# Patient Record
Sex: FEMALE | Employment: OTHER | URBAN - METROPOLITAN AREA
[De-identification: names, ages, dates, MRNs, and addresses within clinical notes are randomized per-mention and may not be internally consistent; named-entity substitution may affect disease eponyms.]

---

## 2018-07-10 ENCOUNTER — OFFICE VISIT (OUTPATIENT)
Dept: UROLOGY | Facility: HOSPITAL | Age: 68
End: 2018-07-10
Attending: OBSTETRICS & GYNECOLOGY
Payer: MEDICARE

## 2018-07-10 VITALS
DIASTOLIC BLOOD PRESSURE: 74 MMHG | BODY MASS INDEX: 35.44 KG/M2 | WEIGHT: 200 LBS | SYSTOLIC BLOOD PRESSURE: 138 MMHG | HEIGHT: 63 IN

## 2018-07-10 DIAGNOSIS — N81.6 RECTOCELE: ICD-10-CM

## 2018-07-10 DIAGNOSIS — N95.2 POSTMENOPAUSAL ATROPHIC VAGINITIS: ICD-10-CM

## 2018-07-10 DIAGNOSIS — N39.3 FEMALE STRESS INCONTINENCE: ICD-10-CM

## 2018-07-10 DIAGNOSIS — N39.41 URGE INCONTINENCE: Primary | ICD-10-CM

## 2018-07-10 DIAGNOSIS — N81.84 PELVIC MUSCLE WASTING: ICD-10-CM

## 2018-07-10 LAB
BLOOD URINE: NEGATIVE
CONTROL RUN WITHIN 24 HOURS?: YES
NITRITE URINE: NEGATIVE

## 2018-07-10 PROCEDURE — 99201 HC OUTPT EVAL AND MGNT NEW PT LEVEL 1: CPT

## 2018-07-10 PROCEDURE — 87086 URINE CULTURE/COLONY COUNT: CPT | Performed by: OBSTETRICS & GYNECOLOGY

## 2018-07-10 PROCEDURE — 87147 CULTURE TYPE IMMUNOLOGIC: CPT | Performed by: OBSTETRICS & GYNECOLOGY

## 2018-07-10 RX ORDER — SENNA AND DOCUSATE SODIUM 50; 8.6 MG/1; MG/1
1 TABLET, FILM COATED ORAL DAILY
COMMUNITY

## 2018-07-10 RX ORDER — DEXTROAMPHETAMINE SACCHARATE, AMPHETAMINE ASPARTATE, DEXTROAMPHETAMINE SULFATE AND AMPHETAMINE SULFATE 5; 5; 5; 5 MG/1; MG/1; MG/1; MG/1
TABLET ORAL
Refills: 0 | COMMUNITY
Start: 2018-05-18

## 2018-07-10 RX ORDER — CETIRIZINE HYDROCHLORIDE 10 MG/1
10 TABLET ORAL DAILY
COMMUNITY

## 2018-07-10 RX ORDER — DULOXETIN HYDROCHLORIDE 30 MG/1
30 CAPSULE, DELAYED RELEASE ORAL DAILY
COMMUNITY

## 2018-07-10 RX ORDER — DULOXETIN HYDROCHLORIDE 60 MG/1
60 CAPSULE, DELAYED RELEASE ORAL DAILY
COMMUNITY

## 2018-07-10 NOTE — PATIENT INSTRUCTIONS
[id number] [address] [facility] MEDICINE    BOWEL REGIMEN    Constipation can have detrimental effects on bladder function and can worsen the symptoms of prolapse. It is important to avoid constipation.     The first step for treating constipation is to i

## 2018-07-10 NOTE — PROGRESS NOTES
Ana Rm,   7/10/2018     Referred by family    Patient presents with:   Incontinence: referred by family member  and online search  Prolapse: had bladder surgery in past, pt feels like bladder is \"coming out\"    2008 prolapse surgery    HPI:  +S lesions  Urethra: +atrophy, nontender  Bladder:+fullness, nontender  Vagina: +atrophy  Cervix & Uterus: absent  Adnexa:no masses, nontender  Perineum: nontender  Anus: wnl  Rectum: defer    PELVIS FLOOR NEUROMUSCULAR FUNCTION:  Strength:  1, Unable to hold Cream  Fiber  Miralax  OAB meds    Treatment Plan, Non-surgical:   RN teaching/pt education done  Fiber supplement  Stimulant:  MOM, Miralax    Treatment Plan, Surgical: briefly discussed  Enterocele repair  Posterior perineorrhaphy  Mid-urethral slings (T

## 2018-07-12 ENCOUNTER — TELEPHONE (OUTPATIENT)
Dept: UROLOGY | Facility: HOSPITAL | Age: 68
End: 2018-07-12

## 2018-09-12 ENCOUNTER — OFFICE VISIT (OUTPATIENT)
Dept: UROLOGY | Facility: HOSPITAL | Age: 68
End: 2018-09-12
Attending: OBSTETRICS & GYNECOLOGY
Payer: MEDICARE

## 2018-09-12 VITALS — SYSTOLIC BLOOD PRESSURE: 134 MMHG | DIASTOLIC BLOOD PRESSURE: 84 MMHG

## 2018-09-12 DIAGNOSIS — N81.6 RECTOCELE: Primary | ICD-10-CM

## 2018-09-12 DIAGNOSIS — R35.0 FREQUENCY OF URINATION: ICD-10-CM

## 2018-09-12 LAB
BLOOD URINE: NEGATIVE
CONTROL RUN WITHIN 24 HOURS?: YES
NITRITE URINE: NEGATIVE

## 2018-09-12 PROCEDURE — 81002 URINALYSIS NONAUTO W/O SCOPE: CPT

## 2018-09-12 PROCEDURE — 51797 INTRAABDOMINAL PRESSURE TEST: CPT

## 2018-09-12 PROCEDURE — 51784 ANAL/URINARY MUSCLE STUDY: CPT

## 2018-09-12 PROCEDURE — 51729 CYSTOMETROGRAM W/VP&UP: CPT

## 2018-09-12 NOTE — PROCEDURES
Patient here for urodynamic testing. Procedure explained and confirmed by patient. See evaluation form for results. Both verbal and written discharge instructions were given.   Patient tolerated procedure well and will follow up with Dr. Al Rajput No  Prolapse reduced for testing?   [x]  Yes  []  No  [x]  Pessary  []  Speculum  []  Proctoswab  []  Vag Packing    Additional Notes:Pessary removed after flow study  UROFLOWMETRY: unable to void for uroflow - does not feel urge to void in BR    Post-void []  Typical   [x]  Atypical    Additional Notes:    EMG:  [x]  Reactive []  Non-Reactive    9/12/2018 1:42 PM     PERFORMED BY:  Ruiz Murillo RN      URODYNAMIC PHYSICIAN INTERPRETATION    IMPRESSION:  -/78cc & 317/80cc  +DO @ 300cc  FPC 370cc  No RASHAD    Post-

## 2018-09-12 NOTE — PATIENT INSTRUCTIONS
[facility]  [address] [place] [address],[address]  [place], [facility]  Office: [phone]      Urodynamic Testing Discharge Instructions: There are NO dietary or activity restrictions. You may resume your normal schedule.       You may hav home.    Where will I go to get the catheter removed? Your catheter will be removed in the office. Please call the office to schedule a voiding trial with the nurse. How will the catheter be removed?   A nurse will disconnect your catheter from the [address] [place] [address] Street have a fever over 100.5 for 4 hours or above 101.0 anytime. · You have nausea and/or vomiting. · Burning/pain with urination. Please feel free to call the nurse at [phone] with any questions 8am-4:30pm Monday-Friday.     If the office is closed, y

## 2018-09-25 ENCOUNTER — OFFICE VISIT (OUTPATIENT)
Dept: UROLOGY | Facility: HOSPITAL | Age: 68
End: 2018-09-25
Attending: OBSTETRICS & GYNECOLOGY
Payer: MEDICARE

## 2018-09-25 VITALS
WEIGHT: 200 LBS | DIASTOLIC BLOOD PRESSURE: 68 MMHG | SYSTOLIC BLOOD PRESSURE: 138 MMHG | HEIGHT: 63 IN | BODY MASS INDEX: 35.44 KG/M2

## 2018-09-25 DIAGNOSIS — N81.6 RECTOCELE: Primary | ICD-10-CM

## 2018-09-25 DIAGNOSIS — N39.41 URGE INCONTINENCE: ICD-10-CM

## 2018-09-25 PROCEDURE — 99211 OFF/OP EST MAY X REQ PHY/QHP: CPT

## 2018-09-25 RX ORDER — OXYBUTYNIN CHLORIDE 5 MG/1
5 TABLET, EXTENDED RELEASE ORAL DAILY
Qty: 90 TABLET | Refills: 3 | Status: SHIPPED | OUTPATIENT
Start: 2018-09-25 | End: 2018-12-24

## 2018-09-25 NOTE — PATIENT INSTRUCTIONS
Start oxybutynin ER 5mg daily  Work to improve bowel movements  Call with an update in 4 wks    [facility]    Constipation can have detrimental effects on bladder function and can worsen the symptoms of prola Chilies/Spicy foods Tea   Citrus fruits Tomatoes   Coffee Vinegar   Cranberries Nuts   Grapes Onions   Beer Pickled herring   Bone’s yeast Pineapple   Canned figs Prunes   Champagne Raisins   Cheeses (hard and soft) Rye bread   Chicken livers Saccharin above each time you increase the interval.    5. If you are having difficulty with voiding often during the night, monitor your fluid intake during the evening. Caffeinated beverages such as coffee, soda pop, or chocolate can cause urinary frequency.   The

## 2018-09-25 NOTE — PROGRESS NOTES
Pt presents w/ initial c/o UI, bulge (splints for BM)  Biggest bother is UUI, but does report bulge sx  No med trial  Bowels irregular, known hemorrhoids    Urodynamic testing undergone without complication.   Results reviewed with patient  -/78cc & 317/80c

## 2024-08-26 ENCOUNTER — OFFICE VISIT (OUTPATIENT)
Facility: LOCATION | Age: 74
End: 2024-08-26
Payer: MEDICARE

## 2024-08-26 DIAGNOSIS — N81.9 FEMALE GENITAL PROLAPSE, UNSPECIFIED TYPE: ICD-10-CM

## 2024-08-26 DIAGNOSIS — N39.41 URGE INCONTINENCE: ICD-10-CM

## 2024-08-26 DIAGNOSIS — N39.0 RECURRENT UTI: Primary | ICD-10-CM

## 2024-08-26 DIAGNOSIS — R33.9 INCOMPLETE EMPTYING OF BLADDER: ICD-10-CM

## 2024-08-26 DIAGNOSIS — R31.0 GROSS HEMATURIA: ICD-10-CM

## 2024-08-26 LAB
BILIRUBIN: NEGATIVE
GLUCOSE (URINE DIPSTICK): NEGATIVE MG/DL
KETONES (URINE DIPSTICK): NEGATIVE MG/DL
MULTISTIX LOT#: ABNORMAL NUMERIC
NITRITE, URINE: NEGATIVE
PH, URINE: 6 (ref 4.5–8)
PROTEIN (URINE DIPSTICK): 100 MG/DL
SPECIFIC GRAVITY: 1.02 (ref 1–1.03)
URINE-COLOR: YELLOW
UROBILINOGEN,SEMI-QN: 0.2 MG/DL (ref 0–1.9)

## 2024-08-26 RX ORDER — POTASSIUM CHLORIDE 14.9 MG/ML
INJECTION INTRAVENOUS
COMMUNITY
Start: 2024-07-01

## 2024-08-26 RX ORDER — HYDROCHLOROTHIAZIDE 12.5 MG/1
12.5 TABLET ORAL DAILY
COMMUNITY
Start: 2022-03-24

## 2024-08-26 RX ORDER — DICLOFENAC SODIUM 75 MG/1
75 TABLET, DELAYED RELEASE ORAL 2 TIMES DAILY
COMMUNITY
Start: 2024-05-23

## 2024-08-26 RX ORDER — HYDROXYZINE HYDROCHLORIDE 25 MG/1
25 TABLET, FILM COATED ORAL
COMMUNITY

## 2024-08-26 RX ORDER — ESTRADIOL 0.1 MG/G
CREAM VAGINAL
Qty: 42.5 G | Refills: 5 | Status: SHIPPED | OUTPATIENT
Start: 2024-08-26

## 2024-08-26 RX ORDER — METOPROLOL SUCCINATE 25 MG/1
25 TABLET, EXTENDED RELEASE ORAL DAILY
COMMUNITY
Start: 2024-07-01

## 2024-08-26 RX ORDER — DEXTROAMPHETAMINE SULFATE 20 MG/1
TABLET ORAL
COMMUNITY

## 2024-08-26 RX ORDER — ALBUTEROL SULFATE 90 UG/1
2 AEROSOL, METERED RESPIRATORY (INHALATION)
COMMUNITY

## 2024-08-26 RX ORDER — KETOCONAZOLE 20 MG/G
1 CREAM TOPICAL DAILY
COMMUNITY

## 2024-08-26 RX ORDER — PANTOPRAZOLE SODIUM 20 MG/1
20 TABLET, DELAYED RELEASE ORAL DAILY
COMMUNITY
Start: 2024-06-28

## 2024-08-26 RX ORDER — ACETAMINOPHEN 325 MG/1
325 TABLET ORAL
COMMUNITY

## 2024-08-26 RX ORDER — LEVOTHYROXINE SODIUM 25 UG/1
25 TABLET ORAL DAILY
COMMUNITY

## 2024-08-26 RX ORDER — POLYETHYLENE GLYCOL 3350 17 G/17G
17 POWDER, FOR SOLUTION ORAL DAILY
COMMUNITY
Start: 2024-03-01

## 2024-08-26 NOTE — PATIENT INSTRUCTIONS
Urinary tract infections can affect your general health and your quality of life.  Some UTI’s can be prevented.  Here are some suggestions that my help prevent frequent UTI’s.     Good Hygiene: Always wipe front to back.  Don’t use perfumed soaps.  Plain soaps like Ivory are the best.  Don’t use washcloths.  Place soap directly on your hands and clean the genital area.  Rinse thoroughly.  Soap can be very irritating to the vaginal mucosa.     Urination: Urinate every 2-3 hours during the day.  Empty your bladder just before going to bed and  first thing when you wake up.  Make sure you go to the bathroom when you have a strong urge. Don't hover over the toilet to urinate.  The bladder may not be completely emptying when using this technique.  Sometimes you may need to \"double-void\".  After you finish urinating, stand up, then sit back down to urinate again.     Clothing: Wear cotton underwear. Change daily.  Avoid tight jeans.       [place]: Sometimes UTI’s are related to intercourse.  Wash genital area before and rinse afterwards.  Empty your bladder before and after intercourse.  Use of vaginal lubricants may be helpful.  Condoms and some lubricants may be irritating to the vaginal mucosa.  Spermicide should be avoided.  Talk to your doctor, you may require a suppressive antibiotic to take after intercourse.     Supplements: Take Vitamin C 500 or 1000mg daily.  Cranberry pills 400mg daily or if symptomatic 400mg two times per day.  Eat yogurt or consider taking a probiotic.  D-mannose 1 gram is another supplement that can help prevent infections.  This one is harder to find, but can be found at stores such as [employer], [employer], or a specialized vitamin store.  Fluid intake should be at least 48oz daily with the goal of 64oz daily.  Water is preferable.      Constipation: Constipation has been linked to UTI’s.  You should be having a soft BM daily.  Dietary fiber should be between 20-25 grams daily.   Flaxseed, All-Bran cereal, Fiber One bars, fruits and vegetables are a good sources of fiber.  Alternatively, Metamucil can be used daily.  Gentle laxatives and stool softeners may be added on a daily or as needed basis if necessary (Miralax, Colace, Pericolace).      Vaginal creams and moisturizers: It may be recommended you try vaginal creams, moisturizers or lubricants as a prevention method.  Over-the-Counter products:  Replens:  1 applicator three times per week  Luvena prebiotic vaginal moisturizer and lubricant:  package of 6.  1 tube every three days at bedtime.  Helps restore pH balance, decrease vaginal dryness, odor and itchiness.  KY liquibeads  KY silk - moisture enhancer  MeAgain Vaginal Moisturizer.  8 per package.  Use 1 daily for 7 days then 1 daily two times per week.  Astroglide  Prescriptions:  Estrace Cream  Premarin Cream  E-string     Prescription medications: Your doctor may recommend daily or as needed prescription medications including antibiotics to prevent urinary tract infection as well.

## 2024-08-26 NOTE — PROGRESS NOTES
[facility], [address] [place] [address]    Urology Consult Note    History of Present Illness:   Patient is a 73 year old female with hx of arthritis, hypothyroidism, depression, mixed urinary incontinence, prolapse, who presents for evaluation of multiple issues.    Patient seen at [facility] for gross hematuria as noted below (neg CT and cysto completed). Is also being seen by urogynecology at outside facility for prolapse, currently managed with #2 ring. See below notes for both. Has had recurrent UTIs, no current preventative treatment plan in place.     Here today more so for management of recurrent UTI.  Baseline LUTS - nocturia x 2-3, UUI wearing depends or pads - has improved since pessary. Still issue at night.   Tries timed voids and voiding techniques.  Drinks 5-6 bottles of water daily.   Bowels improved, on miralax.   Wipes front to back +.   + vaginal irritation. Had been prescribed estrace cream previously but discontinued due to the cost.    UA today large leuks, blood  PVR 142mL    Brings records Scr 7/26/24 0.93. Urine culture 8/14 negative but another urine culture 8/15 enterococcus.     Patient presented to [facility] ED for gross hematuria and abdominal pain. CT scan with multiple peripheral enhancing fluid collections with largest located anterior to the bladder and containing gas approximately measuring 4.8 x 6.5 cm. Infected fluid collections cannot be excluded. She was admitted and underwent cystoscopy to rule out fistula. Cystoscopy showed a very inflamed bladder without evidence of fistula, biopsies taken were negative malignancy. Follow up contrast CT scan also without evidence of fistula. Final urine culture candida albicans 25-50K, treated with metronidazole. Has had subsequent enterococcus UTI treated with amoxicillin TID.     Was seen by urogynecology in 2022 for urinary complaints and pelvic prolapse. Failed pessary trial at that time and offered colpocleisis which  she was nervous about not being able to have penetrative intercourse. She was advised to return after seeing GI for management of her chronic constipation if she wanted to pursue surgery. Her PVR at that time was 240mL.  Returned to see them a few weeks ago and had #2 ring with support placed with improvement of her urinary complaints. Repeat PVR last week was 150mL. She will be seeing them in 1 month.     HISTORY:  Past Medical History:    Arthritis    Depression      Past Surgical History:   Procedure Laterality Date    Hysterectomy  1984    Other surgical history  2007    bariatric bypass    Other surgical history  2009    hernia repair    Other surgical history  2008    bladder repair      No family history on file.   Social History:   Social History     Socioeconomic History    Marital status: Unknown   Tobacco Use    Smoking status: Former     Social Determinants of Health     Financial Resource Strain: High Risk (8/21/2024)    Received from [facility]    Overall Financial Resource Strain (CARDIA)     Difficulty of Paying Living Expenses: Very hard   Food Insecurity: Food Insecurity Present (8/21/2024)    Received from [facility]    Hunger Vital Sign     Worried About Running Out of Food in the Last Year: Often true     Ran Out of Food in the Last Year: Often true   Transportation Needs: Unmet Transportation Needs (8/21/2024)    Received from [facility]    PRAPARE - Transportation     Lack of Transportation (Medical): Yes     Lack of Transportation (Non-Medical): Yes   Housing Stability: High Risk (8/21/2024)    Received from [facility]    Housing Stability Vital Sign     Unable to Pay for Housing in the Last Year: Yes     In the last 12 months, was there a time when you did not have a steady place to sleep or slept in a shelter (including now)?: No        Allergies  No Known Allergies    Review of Systems:   A 10-point  review of systems was completed and is negative other than as noted above.    Physical Exam:   There were no vitals taken for this visit.    GENERAL APPEARANCE: well developed, well nourished, in no acute distress  NEUROLOGIC: no localizing neurologic signs, alert and oriented x 3, converses appropriately  HEAD: atraumatic, normocephalic  EYES: sclera non-icteric  ORAL CAVITY: mucosa moist  NECK/THYROID: no obvious masses or goiter  LUNGS: non-labored breathing  EXTREMITIES: warm, well-perfused. No clubbing, cyanosis or edema.  SKIN: no obvious rashes    Results:     Laboratory Data:  No results found for: \"WBC\", \"HGB\", \"PLT\"  No results found for: \"NA\", \"K\", \"CL\", \"CO2\", \"BUN\", \"CREATININE\", \"GLU\", \"GFRAA\", \"AST\", \"ALT\", \"TP\", \"ALB\", \"PHOS\", \"CA\", \"MG\"    Urinalysis Results (last three years):  No results for input(s)  in the last 3 years    Urine Culture Results (last three years):  Lab Results   Component Value Date    URINECUL (A) 07/10/2018     <10,000 CFU/ML Streptococcus agalactiae (Group B beta strep)       Imaging  No results found.    Operative Report - Michael Palmer MD - 07/08/2024 8:14 AM CDT  Formatting of this note is different from the original.  [facility]    OPERATIVE REPORT      Pre-operative Diagnosis: Gross hematuria    Post-operative Diagnosis: Same    Procedure:  Cystoscopy  Clot evacuation  Transurethral resection of bladder tumor, ~2cm in size  Cystogram  Fluoroscopy, with interpretation of images, less than one hour    Surgeon: Michael Palmer MD    Assistant(s): None    Anesthesia: General with LMA    Prophylactic Antibiotics Given: Ceftriaxone on the floor    Procedural Technique:  Patient is a 74 yo F with gross hematuria and recurrent UTIs. CT scan showed likely abscesses adjacent to the bladder wall. The patient is here for cystoscopy, bladder biopsy. The risk, benefits, and alternatives were discussed with the patient. Risks include pain, bleeding, infection,  damage to surrounding structures, discomfort due to the catheter. The patient reports understanding and is willing to proceed.    Patient was appropriately identified in the pre-operative holding area with two patient identifiers. Consent was reviewed with the patient, who was in agreement. The patient was brought back to the operating room and transferred to the operating room table. Bilateral SCDs were placed. Melyssa-operative antibiotics in the form of ceftriaxone was given to the patient on the floor. General anesthesia was induced with a LMA. The patient was placed in the dorsal lithotomy position, carefully noting to pad all bony prominences. All joints were in neutral position. The patient was prepped and draped in the usual sterile manner. A time-out was initiated.    We entered her bladder with a 22Fr rigid cystourethroscope. There was clot that we evacuated with a Rolf syringe. The bladder wall appeared very inflamed and irritated. We obtained urine for cytology. We performed a cystogram. The bladder was not spherical. There was a protrusion at the left dome of the bladder. No obvious extravasation. We switched to the resectoscope. We resected a nodular area at the bladder dome and at the posterior bladder wall. Each areas was about 1cm in size. We obtained hemostasis of these areas. There were multiple areas oozing the bladder. We tried to cauterize these areas as best as possible, but it seemed like the more we cauterized, additional areas of bleeding around the cauterized areas would bleed. We placed a 22Fr three-way catheter, but did not start continuous bladder irrigation. WOULD NOT RECOMMEND CONTINUOUS BLADDER IRRIGATION because the bladder wall appeared thin and I am concerned about bladder rupture. We manually irrigated the bladder easily, but there was thin hematuric urine noted. She was cleaned and dried. She was taken out of the dorsal lithotomy position. She was awoken from general anesthesia  and transferred to the PACU to recover.    Disposition: Patient will be monitored on the floor. We will consult surgery regarding concern for colovesical fistula. We will also discuss management based on the pathology    Findings:  Very inflamed and irritated bladder wall. Nodular areas are the dome and posterior wall biopsied.  We tried to obtain hemostasis as best as possible, but there was still mild oozing despite cauterization  Choudhury catheter placed    I spoke with the patient's daughter.    Estimated blood loss: Minimal    Specimen/Tissue Removal:  ID Type Source Tests Collected by Time Destination  1 : Urine Straight Cath Urine Straight Cath Michael Palmer MD 7/8/2024 0849  2 : bladder urine for cytology Fluid Fluid FLUID CYTOLOGY MISCELLANEOUS Michael Palmer MD 7/8/2024 0852  A : bladder dome biopsy Tissue Tissue PATHOLOGY SURGICAL Michael Palmer MD 7/8/2024 0839  B : posterior bladder wall Tissue Tissue PATHOLOGY SURGICAL Michael Palmer MD 7/8/2024 0842    Complications: None    Michael Palmer MD     PATH #:[id number]  SPECIMEN DATE:   Accession #: [id number]  Date of Surgery: 07/08/2024    FINAL PATHOLOGY DIAGNOSIS:    A. Bladder dome biopsy: Markedly inflamed fibromuscular tissues with acute  inflammatory exudate and abscess formation.             No evidence of malignancy.    B. Posterior bladder wall biopsy: Markedly inflamed fibromuscular tissues  with acute inflammatory exudate and abscess  formation.             No evidence of malignancy.    Comment: Immunostains CKAE 1/AE3, P63, desmin and KAVEH -3 on paraffin blocks  A1 and B1 supports the above findings.  All immunostain controls show appropriate reactivity.    QA review: Key portion of case is reviewed by one or more pathologists as  part of Pathology QA process.            Alissa Avalos MD, FCAP  Electronic Signature  7/9/2024     Impression:     Patient is a 73 year old female with hx of arthritis, hypothyroidism, depression,  mixed urinary incontinence, prolapse, who presents for evaluation of multiple issues.    Records reviewed with patient and daughter.    We reviewed cystitis tx and prevention strategies at today's visit and I provided educational materials for this. Discussed dietery and behavioral issues including cranberry / extract pills / supplements, fluids, voiding technique, post coital hygiene. Double voiding, bending over after void to completely empty. I encouraged the patient to drink at least 40-60 oz water per day or enough to keep urine clear. I also reviewed dietary irritants and provided educational materials for this. We reviewed use of estrace cream in prevention of UTI, patient willing to trial again. Script sent.     Reviewed recent cytoscopy and CT scan results from July 2024. Pathology negative, suspected from above.     Finally we discussed her prolapse, which I recommend she continue to follow with urogynecology team. Improvement of residual with pessary placement which is likely another factor to #1.    Recommendations:  As above. VIKOR PCR today. Estrace cream. Consider methenamine in future.     Thank you very much for this consult. Please call if there are any questions or concerns.     Irma Amaya PA-C  Urology  [facility]    Date: 8/26/2024

## 2024-08-29 ENCOUNTER — TELEPHONE (OUTPATIENT)
Dept: SURGERY | Facility: CLINIC | Age: 74
End: 2024-08-29

## 2024-08-29 NOTE — TELEPHONE ENCOUNTER
VIKOR with Klebsiella, Staph, and Enterococcus    First line for Klebsiella only Bactrim  Other options Augmentin or fosfomycin or cipro.    Will treat with Augmentin to cover enterococcus also.    Augmentin BID x 7 days

## 2024-09-13 ENCOUNTER — TELEPHONE (OUTPATIENT)
Dept: SURGERY | Facility: CLINIC | Age: 74
End: 2024-09-13

## 2024-09-13 NOTE — TELEPHONE ENCOUNTER
Patients daughter Maribel calling and a  Augmentin, twice daily for 7 days. Please call contact daughter to discuss script dosage first at [phone],thanks.

## 2024-09-13 NOTE — TELEPHONE ENCOUNTER
Called pt's daughter to discuss below.   Daughter states pt is just now starting the antibiotics.   Daughter would like to know if we can send for 3 more days (total of 10 days).    ANIBAL Solis: For your review.

## 2024-09-13 NOTE — TELEPHONE ENCOUNTER
Irma Jimenez PA-C  You; [facility] Clinical Staff11 minutes ago (12:48 PM)     ok for additional 3 days       Called pt's daughter and discussed above.  Script sent.

## 2024-10-04 ENCOUNTER — TELEPHONE (OUTPATIENT)
Dept: SURGERY | Facility: CLINIC | Age: 74
End: 2024-10-04

## 2024-10-04 ENCOUNTER — OFFICE VISIT (OUTPATIENT)
Dept: SURGERY | Facility: CLINIC | Age: 74
End: 2024-10-04
Payer: MEDICARE

## 2024-10-04 DIAGNOSIS — R33.9 INCOMPLETE EMPTYING OF BLADDER: ICD-10-CM

## 2024-10-04 DIAGNOSIS — N95.2 VAGINAL ATROPHY: ICD-10-CM

## 2024-10-04 DIAGNOSIS — N81.10 VAGINAL WALL PROLAPSE: ICD-10-CM

## 2024-10-04 DIAGNOSIS — N39.0 RECURRENT UTI: Primary | ICD-10-CM

## 2024-10-04 LAB
BILIRUBIN: NEGATIVE
GLUCOSE (URINE DIPSTICK): NEGATIVE MG/DL
MULTISTIX LOT#: ABNORMAL NUMERIC
NITRITE, URINE: NEGATIVE
PH, URINE: 7 (ref 4.5–8)
PROTEIN (URINE DIPSTICK): >=300 MG/DL
SPECIFIC GRAVITY: 1.02 (ref 1–1.03)
URINE-COLOR: YELLOW
UROBILINOGEN,SEMI-QN: 0.2 MG/DL (ref 0–1.9)

## 2024-10-04 PROCEDURE — 81003 URINALYSIS AUTO W/O SCOPE: CPT | Performed by: PHYSICIAN ASSISTANT

## 2024-10-04 PROCEDURE — 51798 US URINE CAPACITY MEASURE: CPT | Performed by: PHYSICIAN ASSISTANT

## 2024-10-04 PROCEDURE — 99214 OFFICE O/P EST MOD 30 MIN: CPT | Performed by: PHYSICIAN ASSISTANT

## 2024-10-04 RX ORDER — CIPROFLOXACIN 500 MG/1
500 TABLET, FILM COATED ORAL EVERY 12 HOURS
COMMUNITY
Start: 2024-09-22

## 2024-10-04 NOTE — PATIENT INSTRUCTIONS
Disp Refills Start End    estradiol (ESTRACE) 0.1 MG/GM Vaginal Cream 42.5 g 5 8/26/2024 --    Sig: Apply a small (pea-sized) amount to periurethral region daily for 2 weeks, followed by three times weekly thereafter    Sent to pharmacy as: Estradiol 0.1 MG/GM Vaginal Cream (Estrace)    E-Prescribing Status: Receipt confirmed by pharmacy (8/26/2024 11:54 AM CDT)      Pharmacy    [facility] [address] [place] [address] [phone], [phone]     Fingertip Application Method  For Estrogen Cream     Apply vaginal estrogen cream nightly at bedtime to external vagina for 2 weeks straight.  After 2 weeks, use cream 3 nights a week (i.e Monday, Wednesday, Friday) thereafter.   Follow the instructions below to help you.      Wash your hands thoroughly. Discard the plastic applicator.      Squeeze tube to express out enough to cover the tip of your index finger, from the last joint to the finger tip.  Figure 1       Figure 1     Locate vaginal opening.  Immediately above the vagina is the urethra (a small opening where urine is eliminated from your body). Figure 2   Carefully spread the cream onto the vaginal/urethral area.  Some may be gently inserted into the vagina additionally.          Figure 2    Cystoscopy    Cystoscopy is a test that allows your doctor to look at the inside of the bladder and the urethra using a thin, lighted instrument called a cystoscope.  The cystoscope is inserted into your urethra and slowly advanced into the bladder. Cystoscopy allows your doctor to look at areas of your bladder and urethra that usually do not show up well on X-rays. Tiny surgical instruments can be inserted through the cystoscope that allow your doctor to remove samples of tissue (biopsy) or samples of urine.  Small bladder stones and some small growths can be removed during cystoscopy. This may eliminate the need for more extensive surgery.     Why It Is Done  Cystoscopy may be done to:  Find the  cause of symptoms such as blood in the urine (hematuria), painful urination (dysuria), urinary incontinence, urinary frequency or hesitancy, an inability to pass urine (retention), or a sudden and overwhelming need to urinate (urgency).   Find the cause of problems of the urinary tract, such as frequent, repeated urinary tract infections or urinary tract infections that do not respond to treatment.   Look for problems in the urinary tract, such as blockage in the urethra caused by an enlarged prostate, kidney stones, or tumors.   Evaluate problems that cannot be seen on X-ray or to further investigate problems detected by ultrasound or during intravenous pyelography, such as kidney stones or tumors.   Remove tissue samples for biopsy.   Remove foreign objects.   Treat urinary tract problems. For example, cystoscopy can be done to remove urinary tract stones or growths, treat bleeding in the bladder, relieve blockages in the urethra, or treat or remove tumors.   How To Prepare  You may eat and drink normally before the procedure. You may be asked to give a urine sample at the time of your procedure. Please do not urinate before.    How It Is Done  Cystoscopy is performed by a urologist, with one or more assistants. The test is done in a special testing room in the doctor's office.  You will need undress from the waste down, and you will be given a cloth or paper covering to use during the test. You will lie on your back on a special table. Females will have their knees bent and feet placed in stirrups. Males will lay flat on the table, legs straight. Your genital area is cleaned with an antiseptic solution, and your abdomen and thighs are covered with sterile cloths. A local anesthetic jelly will be inserted into the urethra. No needles are used.   After the anesthetic takes effect, a well-lubricated cystoscope is inserted into your urethra and slowly advanced into your bladder. If your urethra has a spot that is too  narrow to allow the scope to pass, other smaller instruments are inserted first to gradually enlarge the opening.  After the cystoscope is inside your bladder, either sterile water or saline is injected through the scope to help expand your bladder and to create a clear view. Tiny instruments may be inserted through the scope to collect tissue samples for biopsy if necessary; the tissue samples then are sent to the laboratory for analysis.  The cystoscope is usually in your bladder for only 2 to 10 minutes. But the entire test may take up to 30 minutes or longer.  You will be able to get up immediately following the procedure and proceed with normal daily activities.     How It Feels  Most people report that this test is not nearly as uncomfortable as they had expected.   When local anesthetic is used, you may feel a burning sensation or an urge to urinate when the instrument is inserted and removed. Also, when your bladder is irrigated with sterile water or saline, you may feel a cool sensation, an uncomfortable fullness, and an urgent need to urinate. Try to relax during the test by taking slow, deep breaths. Also, if the test is lengthy, lying on the table can become tiring and uncomfortable.     After the test  After the test, you may need to urinate frequently, with some burning during and after urination for a day or two. Drink lots of fluids to help minimize the burning and to prevent a urinary tract infection. You may also see a tinge of blood in the urine for 2-3 days.    You will be given an instruction sheet following your cystoscopy with post procedure instructions.     Results  Cystoscopy is a test that allows the doctor to look at the inside of the bladder and the urethra. Your doctor may be able to talk to you about some of the results right after the cystoscopy. If a biopsy is preformed, the results usually take several days to become available. You will be called promptly with results.   Thank you  for the pleasure of allowing us to be involved in your care.     [phone]

## 2024-10-04 NOTE — PROGRESS NOTES
Subjective:   Mickie Schroeder is a 73 year old female with hx of arthritis, hypothyroidism, depression, mixed urinary incontinence, prolapse, who presents for follow up recurrent UTI.    Patient seen approximately 1 month ago for same, recommended VIKOR testing and initiation of estrace cream. She was treated with Augmentin BID x 10 days for Klebsiella/Staph/Enterococcus bacteriuria on VIKOR. Patient didn't start treatment until 9/13/24. She was taken to a local clinic in [place] and prescribed Augmentin again 9/20/24. The next day she was seen at the local ED for generalized weakness. They switched her antibiotic to Cipro and she completed that course and then resumed the Augmentin that had been prescribed.     Patient feels like she is having difficulties emptying her bladder. Has the pessary, but unsure if it helping because she has to push her \"hernia\" up to void more. She has been constipated. Denies any dysuria or gross hematuria. Denies fevers. PVR today 226mL.    Daughter notes that her father (patient's ) is not doing well and currently at hospital which is also causing stress. Patient has left arm in sling because she fell.       History/Other:    Chief Complaint Reviewed and Verified  Nursing Notes Reviewed and   Verified  Allergies Reviewed  Medications Reviewed         Current Outpatient Medications   Medication Sig Dispense Refill    ciprofloxacin 500 MG Oral Tab Take 1 tablet (500 mg total) by mouth Q12H.      acetaminophen 325 MG Oral Tab Take 1 tablet (325 mg total) by mouth.      albuterol 108 (90 Base) MCG/ACT Inhalation Aero Soln Inhale 2 puffs into the lungs.      bismuth subsalicylate (PEPTO-BISMOL) 262 MG/15ML Oral Suspension       Dextroamphetamine Sulfate 20 MG Oral Tab Take by mouth.      diclofenac 75 MG Oral Tab EC Take 1 tablet (75 mg total) by mouth 2 (two) times daily.      ketoconazole 2 % External Cream Apply 1 Application topically daily.      levothyroxine 25 MCG Oral Tab  Take 1 tablet (25 mcg total) by mouth daily.      Melatonin 1 MG Oral Chew Tab       metoprolol succinate ER 25 MG Oral Tablet 24 Hr Take 1 tablet (25 mg total) by mouth daily.      pantoprazole 20 MG Oral Tab EC Take 1 tablet (20 mg total) by mouth daily.      Polyethylene Glycol 3350 17 g Oral Powd Pack Take 17 g by mouth daily.      potassium chloride 20 mEq/100mL Intravenous Solution       hydrOXYzine 25 MG Oral Tab Take 1 tablet (25 mg total) by mouth.      hydroCHLOROthiazide 12.5 MG Oral Tab Take 1 tablet (12.5 mg total) by mouth daily.      estradiol (ESTRACE) 0.1 MG/GM Vaginal Cream Apply a small (pea-sized) amount to periurethral region daily for 2 weeks, followed by three times weekly thereafter 42.5 g 5    amphetamine-dextroamphetamine 20 MG Oral Tab   0    DULoxetine HCl 60 MG Oral Cap DR Particles Take 60 mg by mouth daily.         Review of Systems:  Pertinent items are noted in HPI.      Objective:   There were no vitals taken for this visit. Estimated body mass index is 35.43 kg/m² as calculated from the following:    Height as of 9/25/18: 5' 3\" (1.6 m).    Weight as of 9/25/18: 200 lb (90.7 kg).    : pessary in proper position, some posterior prolapse noted beyond pessary, healthy tissue, no bleeding, urethra normal - see nursing staff notes for catheterization    Laboratory Data:  No results found for: \"WBC\", \"HGB\", \"PLT\"  No results found for: \"NA\", \"K\", \"CL\", \"CO2\", \"BUN\", \"CREATININE\", \"GLU\", \"GFRAA\", \"AST\", \"ALT\", \"TP\", \"ALB\", \"PHOS\", \"CA\", \"MG\"    Urinalysis Results (last three years):  Recent Labs     08/26/24  1130 10/04/24  1201   SPECGRAVITY 1.025 1.020   PHURINE 6.0 7.0   NITRITE Negative Negative       Urine Culture Results (last three years):  Lab Results   Component Value Date    URINECUL (A) 07/10/2018     <10,000 CFU/ML Streptococcus agalactiae (Group B beta strep)       Imaging  No results found.    Assessment & Plan:   Recurrent bacteriuria  Incomplete emptying  Pelvic prolapse  managed with ring pessary by urogyne  Vaginal atrophy    Reviewed with patient and daughter UTI prevention. Strongly encouraged patient to start estrace cream as prescribed previously. We also reviewed that given her incomplete emptying may be helpful to proceed with initiation of methenamine also. We will send straight catheterized urine sample for culture (currently on Cipro) to ensure clear. We also discussed consideration for repeat cystoscopy (prior performed due to gross hematuria and biopsies were negative). If negative and ongoing incomplete emptying may need to also consider CIC regimen.     The above impression and plan were discussed in detail with the patient who verbalized understanding and all questions were answered.  A total of 30 minutes were spent on the visit including chart review in preparation for the visit, time with the patient, placing orders and communication with other providers where appropriate.      Irma Amaya PA-C, 10/4/2024

## 2024-10-04 NOTE — TELEPHONE ENCOUNTER
Per daughter patient was supposed to have medication prescribed today, prescription has not been received by pharmacy. Please advise thank you.

## 2024-10-04 NOTE — PROGRESS NOTES
Per ANIBAL Solis straight cath pt for urine culture and PVR.     Pt prepped in sterile fashion.   14fr red rubber catheter inserted using sterile technique.   Drained ~200ml cloudy yellow urine.   Catheter removed, tip intact.   Pt tolerated well.   Urine culture sent out.

## 2024-10-07 RX ORDER — METHENAMINE HIPPURATE 1000 MG/1
1 TABLET ORAL DAILY
Qty: 90 TABLET | Refills: 3 | Status: SHIPPED | OUTPATIENT
Start: 2024-10-07

## 2024-10-07 NOTE — TELEPHONE ENCOUNTER
Methenamine sent to pharmacy.  Should also be picking up estrace cream that ws prescribed from last visit.

## 2024-10-09 ENCOUNTER — TELEPHONE (OUTPATIENT)
Dept: SURGERY | Facility: CLINIC | Age: 74
End: 2024-10-09

## 2024-10-09 RX ORDER — FLUCONAZOLE 200 MG/1
200 TABLET ORAL DAILY
Qty: 14 TABLET | Refills: 0 | Status: SHIPPED | OUTPATIENT
Start: 2024-10-09

## 2024-10-09 NOTE — TELEPHONE ENCOUNTER
Please call to let patient know that her urine test showed yeast - needs to be treated with fluconazole 200mg daily for 14 days. Script has been sent. Should hold methenamnine while taking.

## 2024-10-31 ENCOUNTER — TELEPHONE (OUTPATIENT)
Dept: SURGERY | Facility: CLINIC | Age: 74
End: 2024-10-31

## 2024-10-31 NOTE — TELEPHONE ENCOUNTER
Per patient her catheter fell out today and her urine is dripping and cannot urinate. Please advise

## 2024-10-31 NOTE — TELEPHONE ENCOUNTER
Called pt to discuss below.  Pt states her pessary fell out. (Pt does not have a catheter)  C/o leakage of urine. Same symptoms as last OV on 10/4/24.   Denies pain, pressure, dysuria, frequency, and urgency.   Would like to discuss more with ANIBAL Solis via video visit.   Appt made per pt request.

## 2025-03-24 ENCOUNTER — OFFICE VISIT (OUTPATIENT)
Dept: SURGERY | Facility: CLINIC | Age: 75
End: 2025-03-24

## 2025-03-24 DIAGNOSIS — R32 URINARY INCONTINENCE, UNSPECIFIED TYPE: ICD-10-CM

## 2025-03-24 DIAGNOSIS — N81.9 FEMALE GENITAL PROLAPSE, UNSPECIFIED TYPE: ICD-10-CM

## 2025-03-24 DIAGNOSIS — R33.9 URINARY RETENTION: Primary | ICD-10-CM

## 2025-03-24 PROCEDURE — 99214 OFFICE O/P EST MOD 30 MIN: CPT | Performed by: PHYSICIAN ASSISTANT

## 2025-03-24 RX ORDER — FUROSEMIDE 20 MG/1
1 TABLET ORAL DAILY
COMMUNITY

## 2025-03-25 NOTE — PROGRESS NOTES
Subjective:   Mickie Schroeder is a 74 year old female with hx of arthritis, hypothyroidism, depression, mixed urinary incontinence, prolapse managed with pessary, who presents for follow up.    Patient seen previously for recurrent UTI. Known incomplete emptying of bladder with residual back in October being 226mL. She was started on methenamine and discussed consideration for repeat cystoscopy.    Since that time her  was ill and passed away, during that time she postponed her own care. She developed significant abdominal pain at the beginning of the month and presented to the her local hospital in [place]. Ultimately transferred to [facility] for further care. Underwent ex lap with lysis of adhesions 3/1/25. Had void trial POD #2 that failed, lee replaced. She has had lee since then. Discharged to rehab facility.    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     Here today for follow up. Actually happy with lee catheter at this time because before surgery she was having significant urinary incontinence without control.    History/Other:    Chief Complaint Reviewed and Verified  Nursing Notes Reviewed and   Verified  Allergies Reviewed  Medications Reviewed         Current Outpatient Medications   Medication Sig Dispense Refill    apixaban 5 MG Oral Tab Take 1 tablet (5 mg total) by mouth 2 (two) times daily.      fluconazole 200 MG Oral Tab Take 1 tablet (200 mg total) by mouth daily. 14 tablet 0    methenamine 1 g Oral Tab Take 1 tablet (1 g total) by mouth daily. 90 tablet 3    acetaminophen 325 MG Oral Tab Take 1 tablet (325 mg total) by mouth.      albuterol 108 (90 Base) MCG/ACT Inhalation Aero Soln Inhale 2 puffs into the lungs.      bismuth subsalicylate (PEPTO-BISMOL) 262 MG/15ML Oral  Suspension       Dextroamphetamine Sulfate 20 MG Oral Tab Take by mouth.      diclofenac 75 MG Oral Tab EC Take 1 tablet (75 mg total) by mouth 2 (two) times daily.      ketoconazole 2 % External Cream Apply 1 Application topically daily.      levothyroxine 25 MCG Oral Tab Take 1 tablet (25 mcg total) by mouth daily.      Melatonin 1 MG Oral Chew Tab       metoprolol succinate ER 25 MG Oral Tablet 24 Hr Take 1 tablet (25 mg total) by mouth daily.      pantoprazole 20 MG Oral Tab EC Take 1 tablet (20 mg total) by mouth daily.      Polyethylene Glycol 3350 17 g Oral Powd Pack Take 17 g by mouth daily.      potassium chloride 20 mEq/100mL Intravenous Solution       hydrOXYzine 25 MG Oral Tab Take 1 tablet (25 mg total) by mouth.      hydroCHLOROthiazide 12.5 MG Oral Tab Take 1 tablet (12.5 mg total) by mouth daily.      estradiol (ESTRACE) 0.1 MG/GM Vaginal Cream Apply a small (pea-sized) amount to periurethral region daily for 2 weeks, followed by three times weekly thereafter 42.5 g 5    amphetamine-dextroamphetamine 20 MG Oral Tab   0    DULoxetine HCl 60 MG Oral Cap DR Particles Take 1 capsule (60 mg total) by mouth daily.      furosemide 20 MG Oral Tab Take 1 tablet (20 mg total) by mouth daily.      ciprofloxacin 500 MG Oral Tab Take 1 tablet (500 mg total) by mouth Q12H. (Patient not taking: Reported on 3/24/2025)         Review of Systems:  Pertinent items are noted in HPI.      Objective:   There were no vitals taken for this visit. Estimated body mass index is 35.43 kg/m² as calculated from the following:    Height as of 9/25/18: 5' 3\" (1.6 m).    Weight as of 9/25/18: 200 lb (90.7 kg).    No distress  Non labored respirations  Choudhury draining clear yellow    Laboratory Data:  No results found for: \"WBC\", \"HGB\", \"PLT\"  No results found for: \"NA\", \"K\", \"CL\", \"CO2\", \"BUN\", \"CREATININE\", \"GLU\", \"GFRAA\", \"AST\", \"ALT\", \"TP\", \"ALB\", \"PHOS\", \"CA\", \"MG\"    Urinalysis Results (last three years):  Recent Labs      08/26/24  1130 10/04/24  1201   SPECGRAVITY 1.025 1.020   PHURINE 6.0 7.0   NITRITE Negative Negative       Urine Culture Results (last three years):  Lab Results   Component Value Date    URINECUL No Growth 2 Days 10/04/2024    URINECUL (A) 07/10/2018     <10,000 CFU/ML Streptococcus agalactiae (Group B beta strep)       Imaging  No results found.    Assessment & Plan:   Urinary retention with suspected overflow incontinence  Incomplete emptying of bladder  Pelvic prolapse    Reviewed with patient and daughter considerations.   Patient prefers to keep lee catheter at this time given her prior significant urinary incontinence which is likely to be to some degree overflow incontinence.  Risks of infection reviewed including with lee or chronic incomplete emptying if lee removed. Daughter and patient wish to continue lee.  Lee to be changed today and orders provided for repeat facility to perform regular jie care for lee catheter and flush daily prn.  Family will update me on progression at rehab facility, would recommend VT at some point prior to making final decision regarding a chronic lee catheter, which they are in agreement with.     The above impression and plan were discussed in detail with the patient who verbalized understanding and all questions were answered.  A total of >30 minutes were spent on the visit including chart review in preparation for the visit, time with the patient, placing orders and communication with other providers where appropriate.    Irma Amaya PA-C

## 2025-04-03 ENCOUNTER — TELEPHONE (OUTPATIENT)
Dept: SURGERY | Facility: CLINIC | Age: 75
End: 2025-04-03

## 2025-04-03 NOTE — TELEPHONE ENCOUNTER
Pt has scheduled thru mycdoryt an appointment on 4-14-25 at 10:45 am with notes: Kei came out. Balloon deflated somehow. Need one put in ASAP. Nothing else needed. Thanks.   Please call pt

## 2025-04-04 NOTE — TELEPHONE ENCOUNTER
This encounter is now closed.     RN faxed order instruction to insert lee at [facility] at [phone]

## 2025-05-13 ENCOUNTER — OFFICE VISIT (OUTPATIENT)
Dept: SURGERY | Facility: CLINIC | Age: 75
End: 2025-05-13
Payer: MEDICARE

## 2025-05-13 DIAGNOSIS — R33.9 URINARY RETENTION: Primary | ICD-10-CM

## 2025-05-13 RX ORDER — CATHETER 12 FR
1 EACH MISCELLANEOUS
Qty: 1 EACH | Refills: 0 | Status: SHIPPED | OUTPATIENT
Start: 2025-05-13

## 2025-05-13 NOTE — PROGRESS NOTES
Patient here with daughter.  States the lee came out and needs to replaced.    Identified patient's name, date of birth and his reason for the visit. While on standing position, removed her undergarments. Safely assisted patient to the exam table and made her comfortable.  Draped patient while sterile field started.     The urinary meatus was wiped with Povidone Iodine .16Fr lee was inserted successfully. Denied pain. Inflated the balloon with 10ml of sterile water. Stat Lock was applied to her left upper thigh  giving enough slack on the tubing. Urine noted at the catheter bag.    By the time he left, urine output was observed. Lee Care and Instructions given.

## 2025-05-13 NOTE — PROGRESS NOTES
Patient and daughter without any complaints or concerns apart from lee needs to be replaced. Nursing staff assessed patient and lee replaced.

## 2025-05-28 ENCOUNTER — TELEPHONE (OUTPATIENT)
Dept: SURGERY | Facility: CLINIC | Age: 75
End: 2025-05-28

## 2025-05-28 DIAGNOSIS — R82.90 URINE FINDING: Primary | ICD-10-CM

## 2025-05-28 NOTE — TELEPHONE ENCOUNTER
Per patient requesting orders for a catheter change to be sent to [facility] in [place]. Per patient states [facility] would like to speak with anyone from BREANNA Solis's office before they are able to change patient's catheter.     [facility] phone # - [phone]

## 2025-05-29 NOTE — TELEPHONE ENCOUNTER
This encounter is now closed.     Choudhury catheter was last changed on 5/13 at office.     RN spoke to the staff at [facility], asking for order, H&P, and facesheet  Monthly Choudhury change order and UA/Cx standing order placed/faxed   [facility]   [address] [place] [phone]  Fax [phone]

## 2025-05-30 ENCOUNTER — TELEPHONE (OUTPATIENT)
Dept: SURGERY | Facility: CLINIC | Age: 75
End: 2025-05-30

## 2025-05-30 NOTE — TELEPHONE ENCOUNTER
Received call from nurse Benitez from [facility] from [place]. She is requesting clarification for catheter. She states patient has been having leaking around her catheter. She is asking if she can have order to start catheter change today.     She would also like standing order for UA faxed to them. I faxed order to number below. But if nurse can please clarify catheter order.     Call back number: [phone]  Fax: [phone].

## 2025-06-12 ENCOUNTER — TELEPHONE (OUTPATIENT)
Dept: SURGERY | Facility: CLINIC | Age: 75
End: 2025-06-12

## 2025-06-12 NOTE — TELEPHONE ENCOUNTER
Per patient calling requesting to have her catheter changed, however patient's script only instructs her to change her catheter once a month, patient is requesting this to be changed as her catheter is \"leaking pretty bad\". Please call back.

## 2025-06-13 NOTE — TELEPHONE ENCOUNTER
This encounter is now closed.     Faxed order to [facility] and [facility]:     RN called patient and she confirmed that she lives at [facility], not [facility].   The catheter was just changed 2 weeks ago and [facility] staff won't change without order  Order placed/faxed to [facility]  RN explained at length at leaking does not necessarily mean to change catheter, but to check for catheter placement, check for obstruction or kink, spasm and positioning. She reports last BM on 6/12. She agreed to plans.

## 2025-06-16 RX ORDER — OXYBUTYNIN CHLORIDE 5 MG/1
5 TABLET ORAL 3 TIMES DAILY PRN
Qty: 15 TABLET | Refills: 0 | Status: SHIPPED | OUTPATIENT
Start: 2025-06-16

## 2025-06-16 NOTE — TELEPHONE ENCOUNTER
This encounter is now closed.     RN called patient to follow up. She reports that leaking is getting better, but still have some leaking. RN continue to encourage to monitor positioning, kink, obstruction before taking meds. PA placed PRN order of Oxybutynin 5mg, temporarily. She agreed to plans.

## 2025-06-25 ENCOUNTER — TELEPHONE (OUTPATIENT)
Dept: SURGERY | Facility: CLINIC | Age: 75
End: 2025-06-25

## 2025-06-25 NOTE — TELEPHONE ENCOUNTER
This encounter is now closed.     RN received order forms from [facility]  [phone]

## 2025-06-25 NOTE — TELEPHONE ENCOUNTER
[facility] called.  Mailed on 6-23-25 the physician order for the urology supplies.  Have you received.  Caller was provided the fax number to the office. Will fax today 6-25-25.

## (undated) NOTE — LETTER
Consent to Procedure/Sedation    Date: __9/12/2018_____    Time: ___11:01 AM ___    1. I authorize the performance upon Royden Nageotte the following:  [employer] ([employer])      2.  Josefa Martínez(and whomever is designated as the [facility] ___________________________    ___________________    Witness: ____________________     Date: ______________    Printed: 2018   11:01 AM    Patient Name: Gretel Bernheim        : 1950       Medical Record #: [id number]